# Patient Record
Sex: FEMALE | Race: WHITE
[De-identification: names, ages, dates, MRNs, and addresses within clinical notes are randomized per-mention and may not be internally consistent; named-entity substitution may affect disease eponyms.]

---
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
## 2025-08-10 PROBLEM — Z87.42 HISTORY OF MENORRHAGIA: Status: RESOLVED | Noted: 2019-05-22 | Resolved: 2025-08-10

## 2025-08-10 PROBLEM — R68.89 OTHER GENERAL SYMPTOMS AND SIGNS: Status: RESOLVED | Noted: 2024-06-05 | Resolved: 2025-08-10
